# Patient Record
Sex: MALE | Race: WHITE | Employment: OTHER | URBAN - METROPOLITAN AREA
[De-identification: names, ages, dates, MRNs, and addresses within clinical notes are randomized per-mention and may not be internally consistent; named-entity substitution may affect disease eponyms.]

---

## 2018-09-25 PROBLEM — E11.9 TYPE 2 DIABETES MELLITUS (HCC): Status: ACTIVE | Noted: 2017-06-23

## 2018-09-25 PROBLEM — I35.0 NONRHEUMATIC AORTIC VALVE STENOSIS: Status: ACTIVE | Noted: 2017-05-23

## 2018-09-25 PROBLEM — J44.9 VERY SEVERE CHRONIC OBSTRUCTIVE PULMONARY DISEASE (HCC): Status: ACTIVE | Noted: 2017-05-30

## 2018-09-25 PROBLEM — N17.9 AKI (ACUTE KIDNEY INJURY) (HCC): Status: ACTIVE | Noted: 2018-09-18

## 2018-09-25 PROBLEM — J96.11 CHRONIC RESPIRATORY FAILURE WITH HYPOXIA AND HYPERCAPNIA (HCC): Status: ACTIVE | Noted: 2017-05-30

## 2018-09-25 PROBLEM — G47.33 OSA (OBSTRUCTIVE SLEEP APNEA): Status: ACTIVE | Noted: 2017-05-30

## 2018-09-25 PROBLEM — E16.2 HYPOGLYCEMIA: Status: ACTIVE | Noted: 2018-09-18

## 2018-09-25 PROBLEM — E87.5 HYPERKALEMIA: Status: ACTIVE | Noted: 2018-09-18

## 2018-09-25 PROBLEM — I35.1 AORTIC INSUFFICIENCY: Status: ACTIVE | Noted: 2017-06-22

## 2018-09-25 PROBLEM — J42 CHRONIC BRONCHITIS (HCC): Status: ACTIVE | Noted: 2017-08-31

## 2018-09-25 PROBLEM — I25.10 CORONARY ARTERY DISEASE INVOLVING NATIVE CORONARY ARTERY OF NATIVE HEART WITHOUT ANGINA PECTORIS: Status: ACTIVE | Noted: 2017-05-23

## 2018-09-25 PROBLEM — I50.9 CHF (CONGESTIVE HEART FAILURE), NYHA CLASS IV (HCC): Status: ACTIVE | Noted: 2017-05-05

## 2018-09-25 PROBLEM — J96.12 CHRONIC RESPIRATORY FAILURE WITH HYPOXIA AND HYPERCAPNIA (HCC): Status: ACTIVE | Noted: 2017-05-30

## 2018-09-25 PROBLEM — I48.92 ATRIAL FLUTTER (HCC): Status: ACTIVE | Noted: 2018-03-13

## 2018-09-25 PROBLEM — R79.0 LOW BLOOD MAGNESIUM LEVEL: Status: ACTIVE | Noted: 2017-07-24

## 2018-10-01 ENCOUNTER — TELEPHONE (OUTPATIENT)
Dept: INFECTIOUS DISEASES | Age: 73
End: 2018-10-01

## 2018-10-16 ENCOUNTER — TELEPHONE (OUTPATIENT)
Dept: INFECTIOUS DISEASES | Age: 73
End: 2018-10-16

## 2018-10-17 NOTE — TELEPHONE ENCOUNTER
I called Dr Edwin Wiggins office at [phone]. I spoke with Chin Elizondo and informed her of what Dr Bruna Kelsey has typed. She asked that I fax the labs to her at [phone]. I did this. I called patient and left a message on machine asking for a call back. Our phone number was provided.

## 2018-10-24 ENCOUNTER — OFFICE VISIT (OUTPATIENT)
Dept: INFECTIOUS DISEASES | Age: 73
End: 2018-10-24
Payer: COMMERCIAL

## 2018-10-24 VITALS
BODY MASS INDEX: 36.4 KG/M2 | WEIGHT: 260 LBS | DIASTOLIC BLOOD PRESSURE: 61 MMHG | OXYGEN SATURATION: 86 % | HEIGHT: 71 IN | HEART RATE: 79 BPM | TEMPERATURE: 97.6 F | SYSTOLIC BLOOD PRESSURE: 113 MMHG | RESPIRATION RATE: 14 BRPM

## 2018-10-24 DIAGNOSIS — M46.44 THORACIC DISCITIS: ICD-10-CM

## 2018-10-24 DIAGNOSIS — N17.9 AKI (ACUTE KIDNEY INJURY) (HCC): ICD-10-CM

## 2018-10-24 DIAGNOSIS — Z95.2 H/O PROSTHETIC HEART VALVE: Primary | ICD-10-CM

## 2018-10-24 DIAGNOSIS — M46.44 DISCITIS OF THORACIC REGION: ICD-10-CM

## 2018-10-24 DIAGNOSIS — A41.9 SEPTICEMIA (HCC): ICD-10-CM

## 2018-10-24 PROCEDURE — 99213 OFFICE O/P EST LOW 20 MIN: CPT | Performed by: INTERNAL MEDICINE

## 2018-10-24 RX ORDER — SPIRONOLACTONE 25 MG/1
12.5 TABLET ORAL
COMMUNITY
Start: 2018-10-03 | End: 2018-11-02

## 2018-10-24 RX ORDER — ALBUTEROL SULFATE 2.5 MG/3ML
2.5 SOLUTION RESPIRATORY (INHALATION)
COMMUNITY

## 2018-10-24 RX ORDER — METOPROLOL SUCCINATE 100 MG/1
100 TABLET, EXTENDED RELEASE ORAL
COMMUNITY

## 2018-10-24 RX ORDER — AMOXICILLIN 250 MG
2 CAPSULE ORAL
COMMUNITY
Start: 2017-07-06

## 2018-10-24 RX ORDER — BUMETANIDE 1 MG/1
1 TABLET ORAL
COMMUNITY
Start: 2018-10-03 | End: 2019-09-11

## 2018-10-24 RX ORDER — ONDANSETRON 4 MG/1
4 TABLET, ORALLY DISINTEGRATING ORAL
COMMUNITY
Start: 2018-09-27

## 2018-10-24 RX ORDER — AMIODARONE HYDROCHLORIDE 200 MG/1
200 TABLET ORAL
COMMUNITY

## 2018-10-24 RX ORDER — ALPRAZOLAM 0.5 MG/1
0.5 TABLET ORAL
COMMUNITY

## 2018-10-24 RX ORDER — WARFARIN SODIUM 2.5 MG/1
5 TABLET ORAL
COMMUNITY
Start: 2018-10-02 | End: 2018-11-01

## 2018-10-24 RX ORDER — HYDROCODONE BITARTRATE AND ACETAMINOPHEN 10; 325 MG/1; MG/1
1 TABLET ORAL
COMMUNITY

## 2018-10-24 RX ORDER — BUPROPION HYDROCHLORIDE 150 MG/1
150 TABLET ORAL
COMMUNITY

## 2018-10-24 RX ORDER — METOLAZONE 5 MG/1
5 TABLET ORAL
COMMUNITY

## 2018-10-24 RX ORDER — PREGABALIN 75 MG/1
75 CAPSULE ORAL
COMMUNITY

## 2018-10-24 RX ORDER — TAMSULOSIN HYDROCHLORIDE 0.4 MG/1
0.4 CAPSULE ORAL
COMMUNITY

## 2018-10-24 RX ORDER — FLUTICASONE FUROATE AND VILANTEROL 200; 25 UG/1; UG/1
1 POWDER RESPIRATORY (INHALATION)
COMMUNITY

## 2018-10-24 RX ORDER — LACTULOSE 20 G/30ML
20 SOLUTION ORAL
COMMUNITY
Start: 2018-09-26 | End: 2018-10-26

## 2018-10-24 RX ORDER — ALBUTEROL SULFATE 90 UG/1
2 AEROSOL, METERED RESPIRATORY (INHALATION)
COMMUNITY

## 2018-10-24 RX ORDER — OMEPRAZOLE 20 MG/1
20 CAPSULE, DELAYED RELEASE ORAL
COMMUNITY

## 2018-10-25 NOTE — PROGRESS NOTES
place and time  SKIN:  No rash      Data Review:    Reviewed    IMAGING:          Lennox Bond MD  10/24/2018  9:17 PM      This note was completed using a voice transcription system. Every effort was made to ensure accuracy. However, inadvertent computerized transcription errors may be present.

## 2018-10-26 ENCOUNTER — TELEPHONE (OUTPATIENT)
Dept: INFECTIOUS DISEASES | Age: 73
End: 2018-10-26

## 2018-11-13 ENCOUNTER — TELEPHONE (OUTPATIENT)
Dept: INFECTIOUS DISEASES | Age: 73
End: 2018-11-13

## 2018-11-15 NOTE — TELEPHONE ENCOUNTER
Cont ceftriaxone for 2 wk  Contact coumadin doc and home care to recommend PT/INR every other day while on anbx  Wbc platelets, creatinine, LFTs, CRP and ESR wkly x 2

## 2018-11-28 ENCOUNTER — TELEPHONE (OUTPATIENT)
Dept: INFECTIOUS DISEASES | Age: 73
End: 2018-11-28

## 2018-11-28 ENCOUNTER — OFFICE VISIT (OUTPATIENT)
Dept: INFECTIOUS DISEASES | Age: 73
End: 2018-11-28
Payer: COMMERCIAL

## 2018-11-28 VITALS
SYSTOLIC BLOOD PRESSURE: 100 MMHG | DIASTOLIC BLOOD PRESSURE: 66 MMHG | WEIGHT: 260 LBS | RESPIRATION RATE: 16 BRPM | HEART RATE: 73 BPM | TEMPERATURE: 97 F | HEIGHT: 71 IN | BODY MASS INDEX: 36.4 KG/M2 | OXYGEN SATURATION: 90 %

## 2018-11-28 DIAGNOSIS — E11.8 TYPE 2 DIABETES MELLITUS WITH COMPLICATION, UNSPECIFIED WHETHER LONG TERM INSULIN USE: ICD-10-CM

## 2018-11-28 DIAGNOSIS — M46.44 THORACIC DISCITIS: Primary | ICD-10-CM

## 2018-11-28 DIAGNOSIS — N17.9 AKI (ACUTE KIDNEY INJURY) (HCC): ICD-10-CM

## 2018-11-28 DIAGNOSIS — M46.44 DISCITIS OF THORACIC REGION: ICD-10-CM

## 2018-11-28 DIAGNOSIS — A41.9 SEPTICEMIA (HCC): ICD-10-CM

## 2018-11-28 LAB — SEDIMENTATION RATE, ERYTHROCYTE: NORMAL

## 2018-11-28 PROCEDURE — 99213 OFFICE O/P EST LOW 20 MIN: CPT | Performed by: INTERNAL MEDICINE

## 2018-11-28 RX ORDER — SPIRONOLACTONE 25 MG/1
25 TABLET ORAL
COMMUNITY
End: 2019-11-06 | Stop reason: ALTCHOICE

## 2018-11-28 NOTE — PROGRESS NOTES
[facility]    Follow-up NOTE           Visit date: 11/28/2018      Reason for visit /chief complaints   Discitis    Assessment:      Diagnosis Orders   1. Thoracic discitis  C-Reactive Protein    Sedimentation Rate   2. Septicemia ([address] Secondary to strep viridans     3. Discitis of thoracic region     4. KULDEEP (acute kidney injury) ([address]     5. Type 2 diabetes mellitus with complication, unspecified whether long term insulin use (HCC)           Plan:     Check ESR and CRP  Patient was put on ceftriaxone for 2 more weeks because MRI of the thoracic spine showed discitis appears much more conspicuous     If inflammatory worsen ,may need to extend antibiotic for 2 more weeks. He was advised to have homecare nurse call me the results tomorrow to decide about the plan and if PICC line can be removed     HPI:    Patient is 42-year-old male came in for follow-up. I'm following him for strep viridian septicemia ,thoracic discitis. He was given 6 weeks of ceftriaxone and I ordered a repeat MRI which showed more conspicuous  finding of discitis in the thoracic area, I discussed the findings with the radiologist      I put him on 2 more weeks of cefazolin which she is completing tomorrow. He denies any fever or chills. No cough or shortness of breath. No vomiting or diarrhea. His back pain stable.         Past Medical History:   Diagnosis Date    Acute kidney injury ([address]     KULDEEP (acute kidney injury) ([address] 9/18/2018    Anemia     Aortic insufficiency 6/22/2017    Atrial flutter ([address] 3/13/2018    CHF (congestive heart failure), NYHA class IV ([address] 5/5/2017    Chronic bronchitis ([address] 8/31/2017    Chronic respiratory failure with hypoxia and hypercapnia ([address] 5/30/2017    Coronary artery disease involving native coronary artery of native heart without angina pectoris 5/23/2017    Discitis of thoracic region     Hyperkalemia 9/18/2018    Hypoglycemia 9/18/2018    Hypotension     Low blood

## 2018-11-29 ENCOUNTER — TELEPHONE (OUTPATIENT)
Dept: INFECTIOUS DISEASES | Age: 73
End: 2018-11-29

## 2018-11-29 DIAGNOSIS — M46.44 THORACIC DISCITIS: ICD-10-CM

## 2018-12-20 ENCOUNTER — OFFICE VISIT (OUTPATIENT)
Dept: INFECTIOUS DISEASES | Age: 73
End: 2018-12-20
Payer: COMMERCIAL

## 2018-12-20 VITALS
HEIGHT: 71 IN | DIASTOLIC BLOOD PRESSURE: 61 MMHG | WEIGHT: 258 LBS | SYSTOLIC BLOOD PRESSURE: 104 MMHG | TEMPERATURE: 98.8 F | HEART RATE: 73 BPM | BODY MASS INDEX: 36.12 KG/M2

## 2018-12-20 DIAGNOSIS — E11.8 TYPE 2 DIABETES MELLITUS WITH COMPLICATION, UNSPECIFIED WHETHER LONG TERM INSULIN USE: ICD-10-CM

## 2018-12-20 DIAGNOSIS — M46.44 DISCITIS OF THORACIC REGION: ICD-10-CM

## 2018-12-20 DIAGNOSIS — N17.9 AKI (ACUTE KIDNEY INJURY) (HCC): ICD-10-CM

## 2018-12-20 DIAGNOSIS — M46.44 THORACIC DISCITIS: Primary | ICD-10-CM

## 2018-12-20 PROCEDURE — 99213 OFFICE O/P EST LOW 20 MIN: CPT | Performed by: INTERNAL MEDICINE

## 2018-12-20 NOTE — PROGRESS NOTES
[facility]    Follow-up NOTE           Visit date: 12/20/2018      Reason for visit /chief complaints       Assessment:      Diagnosis Orders   1. Thoracic discitis  Sedimentation Rate    C-Reactive Protein   2. Discitis of thoracic region     3. Type 2 diabetes mellitus with complication, unspecified whether long term insulin use ([address]     4. KULDEEP (acute kidney injury) ([address]             Plan:     Patient has completed a course of ceftriaxone. Check ESR and CRP in January  Follow-up in 4 weeks      HPI:    Patient is 42-year-old male came in for follow-up. I'm following him for thoracic discitis. He was given a course of ceftriaxone. His antibiotic dose was extended because of persistent finding on the MRI    He is feeling much better now   denies any fever or chills. No cough or shortness of breath. No vomiting or diarrhea. Past Medical History:   Diagnosis Date    Acute kidney injury ([address]     KULDEEP (acute kidney injury) ([address] 9/18/2018    Anemia     Aortic insufficiency 6/22/2017    Atrial flutter ([address] 3/13/2018    CHF (congestive heart failure), NYHA class IV ([address] 5/5/2017    Chronic bronchitis ([address] 8/31/2017    Chronic respiratory failure with hypoxia and hypercapnia ([address] 5/30/2017    Coronary artery disease involving native coronary artery of native heart without angina pectoris 5/23/2017    Discitis of thoracic region     Hyperkalemia 9/18/2018    Hypoglycemia 9/18/2018    Hypotension     Low blood magnesium level 7/24/2017    Nonrheumatic aortic valve stenosis 5/23/2017    IZZY (obstructive sleep apnea) 5/30/2017    Septicemia ([address]     Type 2 diabetes mellitus ([address] 6/23/2017    Very severe chronic obstructive pulmonary disease ([address] 5/30/2017     History reviewed. No pertinent surgical history.   Social History     Social History    Marital status:      Spouse name: N/A    Number of children: N/A    Years of education: N/A     Social History Main Topics    Smoking status: Former Smoker     Packs/day: 2.50     Types: Cigarettes     Start date: 58     Quit date: 2017    Smokeless tobacco: Never Used    Alcohol use None    Drug use: Unknown    Sexual activity: Not Asked     Other Topics Concern    None     Social History Narrative    None     History reviewed. No pertinent family history. Current med     Current Outpatient Prescriptions:     spironolactone (ALDACTONE) 25 MG tablet, Take 25 mg by mouth, Disp: , Rfl:     albuterol sulfate  (90 Base) MCG/ACT inhaler, Inhale 2 puffs into the lungs, Disp: , Rfl:     ALPRAZolam (XANAX) 0.5 MG tablet, Take 0.5 mg by mouth. ., Disp: , Rfl:     albuterol (PROVENTIL) (2.5 MG/3ML) 0.083% nebulizer solution, Inhale 2.5 mg into the lungs, Disp: , Rfl:     amiodarone (CORDARONE) 200 MG tablet, Take 200 mg by mouth, Disp: , Rfl:     aspirin 81 MG tablet, Take 81 mg by mouth, Disp: , Rfl:     buPROPion (WELLBUTRIN XL) 150 MG extended release tablet, Take 150 mg by mouth, Disp: , Rfl:     Roflumilast (DALIRESP) 500 MCG tablet, TAKE ONE TABLET BY MOUTH DAILY, Disp: , Rfl:     Fluticasone Furoate-Vilanterol (BREO ELLIPTA) 200-25 MCG/INH AEPB, Inhale 1 puff into the lungs, Disp: , Rfl:     Multiple Vitamins-Minerals (CENTRUM SILVER PO), Take 1 tablet by mouth, Disp: , Rfl:     HYDROcodone-acetaminophen (NORCO)  MG per tablet, Take 1 tablet by mouth. ., Disp: , Rfl:     metolazone (ZAROXOLYN) 5 MG tablet, Take 5 mg by mouth, Disp: , Rfl:     metoprolol succinate (TOPROL XL) 100 MG extended release tablet, Take 100 mg by mouth, Disp: , Rfl:     omeprazole (PRILOSEC) 20 MG delayed release capsule, Take 20 mg by mouth, Disp: , Rfl:     ondansetron (ZOFRAN-ODT) 4 MG disintegrating tablet, Take 4 mg by mouth, Disp: , Rfl:     OXYGEN, 2 L/min, Disp: , Rfl:     pregabalin (LYRICA) 75 MG capsule, Take 75 mg by mouth. ., Disp: , Rfl:     senna-docusate (SENOKOT S) 8.6-50 MG per tablet, Take 2 tablets

## 2019-01-03 LAB — SEDIMENTATION RATE, ERYTHROCYTE: NORMAL

## 2019-01-08 DIAGNOSIS — M46.44 THORACIC DISCITIS: ICD-10-CM

## 2019-01-09 ENCOUNTER — TELEPHONE (OUTPATIENT)
Dept: INFECTIOUS DISEASES | Age: 74
End: 2019-01-09

## 2019-01-09 DIAGNOSIS — M46.44 DISCITIS OF THORACIC REGION: Primary | ICD-10-CM

## 2019-01-24 ENCOUNTER — OFFICE VISIT (OUTPATIENT)
Dept: INFECTIOUS DISEASES | Age: 74
End: 2019-01-24
Payer: COMMERCIAL

## 2019-01-24 VITALS
DIASTOLIC BLOOD PRESSURE: 69 MMHG | TEMPERATURE: 96.9 F | BODY MASS INDEX: 35 KG/M2 | HEART RATE: 61 BPM | SYSTOLIC BLOOD PRESSURE: 110 MMHG | WEIGHT: 250 LBS | HEIGHT: 71 IN

## 2019-01-24 DIAGNOSIS — M46.44 DISCITIS OF THORACIC REGION: Primary | ICD-10-CM

## 2019-01-24 PROCEDURE — 99212 OFFICE O/P EST SF 10 MIN: CPT | Performed by: INTERNAL MEDICINE

## 2019-02-14 LAB — C-REACTIVE PROTEIN: NORMAL

## 2019-02-19 DIAGNOSIS — M46.44 DISCITIS OF THORACIC REGION: ICD-10-CM

## 2019-02-21 DIAGNOSIS — M46.40 DISCITIS, UNSPECIFIED SPINAL REGION: Primary | ICD-10-CM

## 2019-03-01 DIAGNOSIS — M46.40 DISCITIS, UNSPECIFIED SPINAL REGION: ICD-10-CM

## 2019-03-05 ENCOUNTER — TELEPHONE (OUTPATIENT)
Dept: INFECTIOUS DISEASES | Age: 74
End: 2019-03-05

## 2019-03-05 DIAGNOSIS — M46.40 DISCITIS, UNSPECIFIED SPINAL REGION: Primary | ICD-10-CM

## 2019-03-07 ENCOUNTER — OFFICE VISIT (OUTPATIENT)
Dept: INFECTIOUS DISEASES | Age: 74
End: 2019-03-07
Payer: COMMERCIAL

## 2019-03-07 VITALS
HEIGHT: 71 IN | DIASTOLIC BLOOD PRESSURE: 60 MMHG | BODY MASS INDEX: 35.7 KG/M2 | SYSTOLIC BLOOD PRESSURE: 110 MMHG | HEART RATE: 63 BPM | WEIGHT: 255 LBS | TEMPERATURE: 97 F

## 2019-03-07 DIAGNOSIS — M46.44 DISCITIS OF THORACIC REGION: Primary | ICD-10-CM

## 2019-03-07 DIAGNOSIS — E11.8 TYPE 2 DIABETES MELLITUS WITH COMPLICATION, UNSPECIFIED WHETHER LONG TERM INSULIN USE: ICD-10-CM

## 2019-03-07 PROCEDURE — 99212 OFFICE O/P EST SF 10 MIN: CPT | Performed by: INTERNAL MEDICINE

## 2019-07-19 PROBLEM — N17.9 ACUTE-ON-CHRONIC KIDNEY INJURY (HCC): Status: ACTIVE | Noted: 2018-10-01

## 2019-07-19 PROBLEM — N18.9 ACUTE-ON-CHRONIC KIDNEY INJURY (HCC): Status: ACTIVE | Noted: 2018-10-01

## 2019-07-19 PROBLEM — D50.9 IRON DEFICIENCY ANEMIA: Status: ACTIVE | Noted: 2018-12-27

## 2019-07-19 PROBLEM — D63.8 CHRONIC DISEASE ANEMIA: Status: ACTIVE | Noted: 2018-10-29

## 2019-07-19 PROBLEM — K90.9 IRON MALABSORPTION: Status: ACTIVE | Noted: 2018-12-27

## 2019-08-09 ENCOUNTER — TELEPHONE (OUTPATIENT)
Dept: INFECTIOUS DISEASES | Age: 74
End: 2019-08-09

## 2019-08-09 NOTE — TELEPHONE ENCOUNTER
Wife is asking for the lab orders to be faxed to [facility] lab in [facility].

## 2019-09-11 ENCOUNTER — OFFICE VISIT (OUTPATIENT)
Dept: INFECTIOUS DISEASES | Age: 74
End: 2019-09-11
Payer: COMMERCIAL

## 2019-09-11 VITALS
SYSTOLIC BLOOD PRESSURE: 126 MMHG | HEART RATE: 80 BPM | TEMPERATURE: 97.5 F | OXYGEN SATURATION: 96 % | WEIGHT: 252 LBS | DIASTOLIC BLOOD PRESSURE: 73 MMHG | HEIGHT: 71 IN | BODY MASS INDEX: 35.28 KG/M2

## 2019-09-11 DIAGNOSIS — M10.9 GOUT, ARTHRITIS: Primary | ICD-10-CM

## 2019-09-11 PROCEDURE — 99213 OFFICE O/P EST LOW 20 MIN: CPT | Performed by: INTERNAL MEDICINE

## 2019-09-11 RX ORDER — WARFARIN SODIUM 7.5 MG/1
7.5 TABLET ORAL PRN
COMMUNITY
Start: 2019-04-11

## 2019-11-06 ENCOUNTER — OFFICE VISIT (OUTPATIENT)
Dept: INFECTIOUS DISEASES | Age: 74
End: 2019-11-06
Payer: COMMERCIAL

## 2019-11-06 VITALS
OXYGEN SATURATION: 94 % | HEART RATE: 78 BPM | SYSTOLIC BLOOD PRESSURE: 125 MMHG | BODY MASS INDEX: 36.82 KG/M2 | TEMPERATURE: 97.4 F | DIASTOLIC BLOOD PRESSURE: 66 MMHG | WEIGHT: 263 LBS | RESPIRATION RATE: 22 BRPM | HEIGHT: 71 IN

## 2019-11-06 DIAGNOSIS — M10.9 GOUT, ARTHRITIS: Primary | ICD-10-CM

## 2019-11-06 PROCEDURE — 99212 OFFICE O/P EST SF 10 MIN: CPT | Performed by: INTERNAL MEDICINE

## 2020-04-01 ENCOUNTER — TELEPHONE (OUTPATIENT)
Dept: INFECTIOUS DISEASES | Age: 75
End: 2020-04-01

## 2020-05-05 ENCOUNTER — TELEPHONE (OUTPATIENT)
Dept: INFECTIOUS DISEASES | Age: 75
End: 2020-05-05

## 2025-08-22 ENCOUNTER — HOSPITAL ENCOUNTER (OUTPATIENT)
Age: 80
Setting detail: SPECIMEN
Discharge: HOME | End: 2025-08-22

## 2025-08-22 PROCEDURE — 81001 URINALYSIS AUTO W/SCOPE: CPT

## 2025-08-23 ENCOUNTER — HOSPITAL ENCOUNTER (OUTPATIENT)
Age: 80
Setting detail: SPECIMEN
Discharge: HOME | End: 2025-08-23

## 2025-08-23 LAB
ANION GAP SERPL CALCULATED.3IONS-SCNC: 15 MMOL/L (ref 9–16)
BILIRUB UR QL STRIP: ABNORMAL
BUN SERPL-MCNC: 17 MG/DL (ref 8–23)
CALCIUM SERPL-MCNC: 8.2 MG/DL (ref 8.8–10.2)
CHLORIDE SERPL-SCNC: 99 MMOL/L (ref 98–107)
CLARITY UR: ABNORMAL
CO2 SERPL-SCNC: 23 MMOL/L (ref 20–31)
COLOR UR: ABNORMAL
CREAT SERPL-MCNC: 4.7 MG/DL (ref 0.7–1.2)
EPI CELLS #/AREA URNS HPF: NORMAL /HPF (ref 0–5)
ERYTHROCYTE [DISTWIDTH] IN BLOOD BY AUTOMATED COUNT: 14.6 % (ref 11.8–14.4)
GFR, ESTIMATED: 12 ML/MIN/1.73M2
GLUCOSE SERPL-MCNC: 107 MG/DL (ref 82–115)
GLUCOSE UR STRIP-MCNC: ABNORMAL MG/DL
HCT VFR BLD AUTO: 29.7 % (ref 40.7–50.3)
HGB BLD-MCNC: 9.2 G/DL (ref 13–17)
HGB UR QL STRIP.AUTO: ABNORMAL
KETONES UR STRIP-MCNC: NEGATIVE MG/DL
LEUKOCYTE ESTERASE UR QL STRIP: ABNORMAL
MAGNESIUM SERPL-MCNC: 2 MG/DL (ref 1.6–2.4)
MCH RBC QN AUTO: 29.7 PG (ref 25.2–33.5)
MCHC RBC AUTO-ENTMCNC: 31 G/DL (ref 28.4–34.8)
MCV RBC AUTO: 95.8 FL (ref 82.6–102.9)
NITRITE UR QL STRIP: NEGATIVE
NRBC BLD-RTO: 0 PER 100 WBC
PH UR STRIP: 8.5 [PH] (ref 5–8)
PLATELET # BLD AUTO: 327 K/UL (ref 138–453)
PMV BLD AUTO: 9.1 FL (ref 8.1–13.5)
POTASSIUM SERPL-SCNC: 4 MMOL/L (ref 3.7–5.3)
PROT UR STRIP-MCNC: ABNORMAL MG/DL
RBC # BLD AUTO: 3.1 M/UL (ref 4.21–5.77)
RBC #/AREA URNS HPF: NORMAL /HPF (ref 0–2)
SODIUM SERPL-SCNC: 137 MMOL/L (ref 136–145)
SP GR UR STRIP: 1.02 (ref 1–1.03)
UROBILINOGEN UR STRIP-ACNC: NORMAL EU/DL (ref 0–1)
WBC #/AREA URNS HPF: NORMAL /HPF (ref 0–5)
WBC OTHER # BLD: 8.8 K/UL (ref 3.5–11.3)

## 2025-08-23 PROCEDURE — 85027 COMPLETE CBC AUTOMATED: CPT

## 2025-08-23 PROCEDURE — 80048 BASIC METABOLIC PNL TOTAL CA: CPT

## 2025-08-23 PROCEDURE — 83735 ASSAY OF MAGNESIUM: CPT

## 2025-08-26 ENCOUNTER — HOSPITAL ENCOUNTER (OUTPATIENT)
Age: 80
Setting detail: SPECIMEN
Discharge: HOME | End: 2025-08-26

## 2025-08-26 LAB
ALBUMIN SERPL-MCNC: 3 G/DL (ref 3.5–5.2)
ALBUMIN/GLOB SERPL: 1 {RATIO} (ref 1–2.5)
ALP SERPL-CCNC: 86 U/L (ref 40–129)
ALT SERPL-CCNC: <5 U/L (ref 10–50)
ANION GAP SERPL CALCULATED.3IONS-SCNC: 14 MMOL/L (ref 9–16)
AST SERPL-CCNC: 12 U/L (ref 10–50)
BILIRUB DIRECT SERPL-MCNC: 0.1 MG/DL (ref 0–0.2)
BILIRUB INDIRECT SERPL-MCNC: 0.2 MG/DL
BILIRUB SERPL-MCNC: 0.4 MG/DL (ref 0–1.2)
BUN SERPL-MCNC: 23 MG/DL (ref 8–23)
CALCIUM SERPL-MCNC: 8.5 MG/DL (ref 8.8–10.2)
CHLORIDE SERPL-SCNC: 101 MMOL/L (ref 98–107)
CO2 SERPL-SCNC: 22 MMOL/L (ref 20–31)
CREAT SERPL-MCNC: 5.4 MG/DL (ref 0.7–1.2)
ERYTHROCYTE [DISTWIDTH] IN BLOOD BY AUTOMATED COUNT: 15 % (ref 11.8–14.4)
GFR, ESTIMATED: 10 ML/MIN/1.73M2
GLUCOSE SERPL-MCNC: 65 MG/DL (ref 82–115)
HCT VFR BLD AUTO: 31.9 % (ref 40.7–50.3)
HGB BLD-MCNC: 9.9 G/DL (ref 13–17)
MCH RBC QN AUTO: 29.7 PG (ref 25.2–33.5)
MCHC RBC AUTO-ENTMCNC: 31 G/DL (ref 28.4–34.8)
MCV RBC AUTO: 95.8 FL (ref 82.6–102.9)
NRBC BLD-RTO: 0 PER 100 WBC
PLATELET # BLD AUTO: 263 K/UL (ref 138–453)
PMV BLD AUTO: 9.3 FL (ref 8.1–13.5)
POTASSIUM SERPL-SCNC: 4.6 MMOL/L (ref 3.7–5.3)
PROT SERPL-MCNC: 6 G/DL (ref 6.6–8.7)
RBC # BLD AUTO: 3.33 M/UL (ref 4.21–5.77)
SODIUM SERPL-SCNC: 138 MMOL/L (ref 136–145)
WBC OTHER # BLD: 8.2 K/UL (ref 3.5–11.3)

## 2025-08-26 PROCEDURE — 80048 BASIC METABOLIC PNL TOTAL CA: CPT

## 2025-08-26 PROCEDURE — 85027 COMPLETE CBC AUTOMATED: CPT

## 2025-08-26 PROCEDURE — 80076 HEPATIC FUNCTION PANEL: CPT

## 2025-08-26 PROCEDURE — 36415 COLL VENOUS BLD VENIPUNCTURE: CPT

## 2025-08-27 ENCOUNTER — HOSPITAL ENCOUNTER (OUTPATIENT)
Age: 80
Setting detail: SPECIMEN
Discharge: HOME | End: 2025-08-27

## 2025-08-27 LAB
ALBUMIN SERPL-MCNC: 3.3 G/DL (ref 3.5–5.2)
ALBUMIN/GLOB SERPL: 1.1 {RATIO} (ref 1–2.5)
ALP SERPL-CCNC: 84 U/L (ref 40–129)
ALT SERPL-CCNC: 9 U/L (ref 10–50)
ANION GAP SERPL CALCULATED.3IONS-SCNC: 14 MMOL/L (ref 9–16)
AST SERPL-CCNC: 12 U/L (ref 10–50)
BASOPHILS # BLD: 0.04 K/UL (ref 0–0.2)
BASOPHILS NFR BLD: 1 % (ref 0–2)
BILIRUB DIRECT SERPL-MCNC: 0.2 MG/DL (ref 0–0.2)
BILIRUB INDIRECT SERPL-MCNC: 0.1 MG/DL (ref 0–1)
BILIRUB SERPL-MCNC: 0.3 MG/DL (ref 0–1.2)
BUN SERPL-MCNC: 24 MG/DL (ref 8–23)
CALCIUM SERPL-MCNC: 8.7 MG/DL (ref 8.6–10.4)
CHLORIDE SERPL-SCNC: 99 MMOL/L (ref 98–107)
CO2 SERPL-SCNC: 22 MMOL/L (ref 20–31)
CREAT SERPL-MCNC: 5.6 MG/DL (ref 0.7–1.2)
EOSINOPHIL # BLD: 0.37 K/UL (ref 0–0.44)
EOSINOPHILS RELATIVE PERCENT: 4 % (ref 1–4)
ERYTHROCYTE [DISTWIDTH] IN BLOOD BY AUTOMATED COUNT: 14.9 % (ref 11.8–14.4)
GFR, ESTIMATED: 10 ML/MIN/1.73M2
GLOBULIN SER CALC-MCNC: 3 G/DL
GLUCOSE SERPL-MCNC: 88 MG/DL (ref 74–99)
HCT VFR BLD AUTO: 31.2 % (ref 40.7–50.3)
HGB BLD-MCNC: 9.5 G/DL (ref 13–17)
IMM GRANULOCYTES # BLD AUTO: 0.08 K/UL (ref 0–0.3)
IMM GRANULOCYTES NFR BLD: 1 %
LYMPHOCYTES NFR BLD: 1.82 K/UL (ref 1.1–3.7)
LYMPHOCYTES RELATIVE PERCENT: 21 % (ref 24–43)
MCH RBC QN AUTO: 29 PG (ref 25.2–33.5)
MCHC RBC AUTO-ENTMCNC: 30.4 G/DL (ref 28.4–34.8)
MCV RBC AUTO: 95.1 FL (ref 82.6–102.9)
MONOCYTES NFR BLD: 0.82 K/UL (ref 0.1–1.2)
MONOCYTES NFR BLD: 9 % (ref 3–12)
NEUTROPHILS NFR BLD: 64 % (ref 36–65)
NEUTS SEG NFR BLD: 5.56 K/UL (ref 1.5–8.1)
NRBC BLD-RTO: 0 PER 100 WBC
PLATELET # BLD AUTO: 307 K/UL (ref 138–453)
PMV BLD AUTO: 10.2 FL (ref 8.1–13.5)
POTASSIUM SERPL-SCNC: 4.7 MMOL/L (ref 3.7–5.3)
PROT SERPL-MCNC: 6.3 G/DL (ref 6.6–8.7)
RBC # BLD AUTO: 3.28 M/UL (ref 4.21–5.77)
RBC # BLD: ABNORMAL 10*6/UL
SODIUM SERPL-SCNC: 135 MMOL/L (ref 136–145)
WBC OTHER # BLD: 8.7 K/UL (ref 3.5–11.3)

## 2025-09-02 ENCOUNTER — HOSPITAL ENCOUNTER (OUTPATIENT)
Age: 80
Setting detail: SPECIMEN
Discharge: HOME | End: 2025-09-02

## 2025-09-02 LAB
ALBUMIN SERPL-MCNC: 2.6 G/DL (ref 3.5–5.2)
ALBUMIN/GLOB SERPL: 0.9 {RATIO} (ref 1–2.5)
ALP SERPL-CCNC: 176 U/L (ref 40–129)
ALT SERPL-CCNC: <5 U/L (ref 10–50)
ANION GAP SERPL CALCULATED.3IONS-SCNC: 16 MMOL/L (ref 9–16)
AST SERPL-CCNC: 16 U/L (ref 10–50)
BILIRUB DIRECT SERPL-MCNC: 0.2 MG/DL (ref 0–0.2)
BILIRUB INDIRECT SERPL-MCNC: 0.3 MG/DL
BILIRUB SERPL-MCNC: 0.6 MG/DL (ref 0–1.2)
BUN SERPL-MCNC: 45 MG/DL (ref 8–23)
CALCIUM SERPL-MCNC: 7.9 MG/DL (ref 8.8–10.2)
CHLORIDE SERPL-SCNC: 97 MMOL/L (ref 98–107)
CO2 SERPL-SCNC: 21 MMOL/L (ref 20–31)
CREAT SERPL-MCNC: 5.9 MG/DL (ref 0.7–1.2)
ERYTHROCYTE [DISTWIDTH] IN BLOOD BY AUTOMATED COUNT: 15.5 % (ref 11.8–14.4)
GFR, ESTIMATED: 9 ML/MIN/1.73M2
GLUCOSE SERPL-MCNC: 50 MG/DL (ref 82–115)
HCT VFR BLD AUTO: 31.3 % (ref 40.7–50.3)
HGB BLD-MCNC: 9.8 G/DL (ref 13–17)
MCH RBC QN AUTO: 29.6 PG (ref 25.2–33.5)
MCHC RBC AUTO-ENTMCNC: 31.3 G/DL (ref 28.4–34.8)
MCV RBC AUTO: 94.6 FL (ref 82.6–102.9)
NRBC BLD-RTO: 0 PER 100 WBC
PLATELET # BLD AUTO: 189 K/UL (ref 138–453)
PMV BLD AUTO: 9.5 FL (ref 8.1–13.5)
POTASSIUM SERPL-SCNC: 4.4 MMOL/L (ref 3.7–5.3)
PROT SERPL-MCNC: 5.5 G/DL (ref 6.6–8.7)
RBC # BLD AUTO: 3.31 M/UL (ref 4.21–5.77)
SODIUM SERPL-SCNC: 134 MMOL/L (ref 136–145)
WBC OTHER # BLD: 13.9 K/UL (ref 3.5–11.3)

## 2025-09-02 PROCEDURE — 80048 BASIC METABOLIC PNL TOTAL CA: CPT

## 2025-09-02 PROCEDURE — 80076 HEPATIC FUNCTION PANEL: CPT

## 2025-09-02 PROCEDURE — 85027 COMPLETE CBC AUTOMATED: CPT

## 2025-09-02 PROCEDURE — 36415 COLL VENOUS BLD VENIPUNCTURE: CPT
